# Patient Record
Sex: FEMALE | Race: BLACK OR AFRICAN AMERICAN | NOT HISPANIC OR LATINO | Employment: FULL TIME | URBAN - METROPOLITAN AREA
[De-identification: names, ages, dates, MRNs, and addresses within clinical notes are randomized per-mention and may not be internally consistent; named-entity substitution may affect disease eponyms.]

---

## 2019-01-28 ENCOUNTER — HOSPITAL ENCOUNTER (EMERGENCY)
Facility: HOSPITAL | Age: 34
Discharge: HOME | End: 2019-01-28
Attending: EMERGENCY MEDICINE
Payer: MEDICAID

## 2019-01-28 VITALS
RESPIRATION RATE: 20 BRPM | DIASTOLIC BLOOD PRESSURE: 84 MMHG | WEIGHT: 165 LBS | OXYGEN SATURATION: 98 % | TEMPERATURE: 99 F | HEART RATE: 84 BPM | SYSTOLIC BLOOD PRESSURE: 134 MMHG

## 2019-01-28 DIAGNOSIS — M25.571 RIGHT ANKLE PAIN: ICD-10-CM

## 2019-01-28 DIAGNOSIS — S93.411A SPRAIN OF CALCANEOFIBULAR LIGAMENT OF RIGHT ANKLE, INITIAL ENCOUNTER: Primary | ICD-10-CM

## 2019-01-28 PROCEDURE — 99283 EMERGENCY DEPT VISIT LOW MDM: CPT | Mod: 25,ER

## 2019-01-28 PROCEDURE — 25000003 PHARM REV CODE 250: Mod: ER | Performed by: NURSE PRACTITIONER

## 2019-01-28 RX ORDER — NAPROXEN 500 MG/1
500 TABLET ORAL 2 TIMES DAILY PRN
Qty: 30 TABLET | Refills: 0 | Status: SHIPPED | OUTPATIENT
Start: 2019-01-28

## 2019-01-28 RX ORDER — KETOROLAC TROMETHAMINE 10 MG/1
10 TABLET, FILM COATED ORAL
Status: COMPLETED | OUTPATIENT
Start: 2019-01-28 | End: 2019-01-28

## 2019-01-28 RX ADMIN — KETOROLAC TROMETHAMINE 10 MG: 10 TABLET, FILM COATED ORAL at 07:01

## 2019-01-29 NOTE — ED PROVIDER NOTES
"Encounter Date: 1/28/2019       History     Chief Complaint   Patient presents with    Ankle Pain     Pt states she fell, and now her ankle hurts, sounds like she heard something pop      The history is provided by the patient.   Leg Pain    The incident occurred at work. The injury mechanism was a fall (states that she fell while walking out of her house this morning for work and noticed her "foot go in and her ankle went out" - also states that she "heard something pop"). The incident occurred several hours ago. The pain is present in the right ankle. The pain is at a severity of 8/10. The pain has been constant since onset. Pertinent negatives include no numbness, no inability to bear weight, no loss of motion, no muscle weakness, no loss of sensation and no tingling. The symptoms are aggravated by palpation, bearing weight and activity. She has tried nothing for the symptoms.           PCP:      Zelda Jackson NP        Review of patient's allergies indicates:  No Known Allergies  Past Medical History:   Diagnosis Date    Patient denies medical problems      Past Surgical History:   Procedure Laterality Date    NO PAST SURGERIES       History reviewed. No pertinent family history.  Social History     Tobacco Use    Smoking status: Current Every Day Smoker     Packs/day: 0.50     Types: Cigarettes    Smokeless tobacco: Never Used   Substance Use Topics    Alcohol use: No     Frequency: Never    Drug use: No     Review of Systems   Constitutional: Negative for chills and fever.   HENT: Negative for congestion and sore throat.    Respiratory: Negative for cough, chest tightness and shortness of breath.    Cardiovascular: Negative for chest pain.   Gastrointestinal: Negative for abdominal pain, diarrhea, nausea and vomiting.   Genitourinary: Negative for dysuria.   Musculoskeletal: Negative for back pain and neck pain.        Positive for pain of the right lateral ankle.    Skin: Negative for rash. "   Neurological: Negative for dizziness, tingling, weakness, numbness and headaches.   Hematological: Does not bruise/bleed easily.   Psychiatric/Behavioral: Negative for confusion.       Physical Exam     Initial Vitals [01/28/19 1846]   BP Pulse Resp Temp SpO2   139/87 88 18 99.2 °F (37.3 °C) 98 %      MAP       --         Physical Exam    Nursing note and vitals reviewed.  Constitutional: Vital signs are normal. She appears well-developed and well-nourished. She is cooperative. She does not appear ill. No distress.   HENT:   Head: Normocephalic and atraumatic.   Nose: Nose normal.   Mouth/Throat: Uvula is midline, oropharynx is clear and moist and mucous membranes are normal.   Eyes: Conjunctivae, EOM and lids are normal. Pupils are equal, round, and reactive to light.   Neck: Trachea normal and normal range of motion. Neck supple.   Cardiovascular: Normal rate, regular rhythm, intact distal pulses and normal pulses.   Pulmonary/Chest: Effort normal. No respiratory distress.   Musculoskeletal: Normal range of motion. She exhibits no edema.        Right ankle: She exhibits swelling. She exhibits normal range of motion, no ecchymosis and no deformity. Tenderness (reproducible tenderness with mild swelling noted along the lateral aspect of the right ankle upon palpation of the CF ligament - no crepitus noted - neurovascular intact distally). Lateral malleolus and CF ligament tenderness found. No medial malleolus, no AITFL and no proximal fibula tenderness found. Achilles tendon normal.        Feet:    Neurological: She is alert and oriented to person, place, and time. She has normal strength. No sensory deficit. GCS eye subscore is 4. GCS verbal subscore is 5. GCS motor subscore is 6.   Neurovascular intact to all extremities.    Skin: Skin is warm, dry and intact. Capillary refill takes less than 2 seconds. No abrasion, no laceration and no rash noted.   Psychiatric: She has a normal mood and affect. Her speech is  "normal and behavior is normal.         ED Course   Splint Application  Date/Time: 2019 8:05 PM  Performed by: Mike Quinn NP  Authorized by: Mike Quinn NP   Consent Done: Yes  Consent: Verbal consent obtained.  Risks and benefits: risks, benefits and alternatives were discussed  Consent given by: patient  Patient understanding: patient states understanding of the procedure being performed  Site marked: the operative site was marked  Imaging studies: imaging studies available  Patient identity confirmed: , MRN, name and verbally with patient  Time out: Immediately prior to procedure a "time out" was called to verify the correct patient, procedure, equipment, support staff and site/side marked as required.  Location details: right ankle  Splint type: ace bandage.  Supplies used: elastic bandage  Post-procedure: The splinted body part was neurovascularly unchanged following the procedure.  Patient tolerance: Patient tolerated the procedure well with no immediate complications             ED Imaging Results:   Imaging Results          X-Ray Ankle Complete Right (Final result)  Result time 19 19:57:37    Final result by Angelita Garrido MD (19 19:57:37)                 Impression:      Unremarkable exam.      Electronically signed by: Angelita Garrido MD  Date:    2019  Time:    19:57             Narrative:    EXAMINATION:  XR ANKLE COMPLETE 3 VIEW RIGHT    CLINICAL HISTORY:  Pain in right ankle and joints of right foot    COMPARISON:  None    FINDINGS:  Anatomic alignment.  No fracture or joint effusion visualized.                                ED Medications:   Medications   ketorolac tablet 10 mg (10 mg Oral Given 19 191)         ED Course Vitals  Vitals:    19 1846 19 1908 19   BP: 139/87  134/84   BP Location: Right arm  Left arm   Patient Position: Sitting  Sitting   Pulse: 88  84   Resp: 18  20   Temp: 99.2 °F (37.3 °C)   "   TempSrc: Oral     SpO2: 98%  98%   Weight:  74.8 kg (165 lb)          2005 HOURS RE-EVALUATION & DISPOSITION:   Reassessment at the time of disposition demonstrates that the patient is resting comfortably in no acute distress.  She has remained hemodynamically stable throughout the entire ED visit and is without objective evidence for acute process requiring urgent intervention or hospitalization. I discussed test results and provided counseling to patient with regard to condition, the treatment plan, specific conditions for return, and the importance of follow up.  Answered questions at this time. The patient is stable for discharge.         X-Rays:   Independently Interpreted Readings:   Other Readings:  Radiographs of the right ankle reveal no acute findings.     Medical Decision Making:   Clinical Tests:   Radiological Study: Ordered and Reviewed                      Clinical Impression:       ICD-10-CM ICD-9-CM   1. Sprain of calcaneofibular ligament of right ankle, initial encounter S93.411A 845.02   2. Right ankle pain M25.571 719.47           Disposition:   Disposition: Discharged  Condition: Stable  I discussed with patient that the evaluation in the emergency department does not suggest any emergent or life threatening medical condition requiring immediate intervention beyond what was provided in the ED, and I believe patient is safe for discharge.  Regardless, an unremarkable evaluation in the ED does not preclude the development or presence of a serious of life threatening condition. As such, patient was instructed to return immediately for any worsening or change in current symptoms. I also discussed the results of my evaluation and diagnosis with patient and she concurs with the evaluation and management plan.  Detailed written and verbal instructions provided to patient and she expressed a verbal understanding of the discharge instructions and overall management plan. Reiterated the importance of  medication administration and safety and advised patient to follow up with primary care provider in 3-5 days or sooner if needed.  Also advised patient to return to the ER for any complications.     Regarding ANKLE PAIN, for treatment, patient instructed to: rest ankle for several days without applying weight on ankle; use an ACE bandage or ankle brace; consider using crutches or a cane to help take the weight off a sore or unsteady ankle; keep foot/ankle elevated; apply ice to area immediately and for 10-15 minutes every hour for the first day, then, apply ice every 3-4 hours for 2 more days; and try over-the-counter Tylenol or Ibuprofen for pain and swelling. Patient advised to notify primary care provider or return to ED if swelling does not decrease within 2-3 days or notice signs or symptoms of infection (redness, increased pain, fever, and warmth around ankle); or if they notice a popping sound and have immediate trouble using or moving ankle. For prevention, patient advised to obtain/maintain healthy weight; warm up before exercising; avoid sports and activities in which proper conditioning has not been achieved; ensure that shoes fit properly; use ankle support braces, work on balance, and do agility exercises.         Follow-up Information     Call  Zelda Jackson NP.    Specialty:  Nephrology  Why:  If symptoms worsen or as needed  Contact information:  [address] [facility] [address] [phone]                     Discharge Medication List as of 1/28/2019  8:06 PM      START taking these medications    Details   naproxen (NAPROSYN) 500 MG tablet Take 1 tablet (500 mg total) by mouth 2 (two) times daily as needed (Pain)., Starting Mon 1/28/2019, Print                            Mike Quinn NP  01/28/19 8194